# Patient Record
(demographics unavailable — no encounter records)

---

## 2025-03-08 NOTE — ASSESSMENT
[FreeTextEntry1] : There is some improvement on the Sertraline 25 mg.  He still has symptoms.  Will increase to 50 mg and follow-up in a month.  He was counseled.  The BP is now fine at 124/60.  Continue current medications.

## 2025-03-08 NOTE — HISTORY OF PRESENT ILLNESS
[de-identified] : The patient comes in with his wife to follow-up regarding the depression and BP.  He says he is doing better on the Sertraline.  There is more enjoyment and he is managing better.  His wife and children agree he is doing better.  There are still some symptoms.